# Patient Record
Sex: FEMALE | Race: WHITE | NOT HISPANIC OR LATINO | Employment: OTHER | URBAN - NONMETROPOLITAN AREA
[De-identification: names, ages, dates, MRNs, and addresses within clinical notes are randomized per-mention and may not be internally consistent; named-entity substitution may affect disease eponyms.]

---

## 2024-01-15 PROBLEM — I42.9 CARDIOMYOPATHY (MULTI): Status: ACTIVE | Noted: 2024-01-15

## 2024-01-15 PROBLEM — I10 BENIGN ESSENTIAL HYPERTENSION: Status: ACTIVE | Noted: 2024-01-15

## 2024-01-15 RX ORDER — LOSARTAN POTASSIUM 25 MG/1
25 TABLET ORAL DAILY
COMMUNITY

## 2024-01-15 RX ORDER — ACETAMINOPHEN 500 MG
TABLET ORAL
COMMUNITY

## 2024-01-15 RX ORDER — CARVEDILOL PHOSPHATE 40 MG/1
40 CAPSULE, EXTENDED RELEASE ORAL DAILY
COMMUNITY
End: 2024-02-19 | Stop reason: SDUPTHER

## 2024-01-15 RX ORDER — OXYBUTYNIN CHLORIDE 5 MG/1
1 TABLET ORAL DAILY
COMMUNITY

## 2024-01-15 RX ORDER — PHENYLPROPANOLAMINE/CLEMASTINE 75-1.34MG
200 TABLET, EXTENDED RELEASE ORAL DAILY
COMMUNITY

## 2024-02-06 ENCOUNTER — APPOINTMENT (OUTPATIENT)
Dept: CARDIOLOGY | Facility: CLINIC | Age: 79
End: 2024-02-06
Payer: MEDICARE

## 2024-02-19 ENCOUNTER — OFFICE VISIT (OUTPATIENT)
Dept: CARDIOLOGY | Facility: CLINIC | Age: 79
End: 2024-02-19
Payer: MEDICARE

## 2024-02-19 VITALS
HEART RATE: 52 BPM | WEIGHT: 187 LBS | SYSTOLIC BLOOD PRESSURE: 128 MMHG | HEIGHT: 64 IN | DIASTOLIC BLOOD PRESSURE: 84 MMHG | BODY MASS INDEX: 31.92 KG/M2

## 2024-02-19 DIAGNOSIS — Z87.891 FORMER SMOKER: ICD-10-CM

## 2024-02-19 DIAGNOSIS — I10 BENIGN ESSENTIAL HYPERTENSION: ICD-10-CM

## 2024-02-19 DIAGNOSIS — I42.0 DILATED CARDIOMYOPATHY (MULTI): Primary | ICD-10-CM

## 2024-02-19 PROCEDURE — 99214 OFFICE O/P EST MOD 30 MIN: CPT | Performed by: INTERNAL MEDICINE

## 2024-02-19 PROCEDURE — 3079F DIAST BP 80-89 MM HG: CPT | Performed by: INTERNAL MEDICINE

## 2024-02-19 PROCEDURE — 1036F TOBACCO NON-USER: CPT | Performed by: INTERNAL MEDICINE

## 2024-02-19 PROCEDURE — 3074F SYST BP LT 130 MM HG: CPT | Performed by: INTERNAL MEDICINE

## 2024-02-19 PROCEDURE — 1159F MED LIST DOCD IN RCRD: CPT | Performed by: INTERNAL MEDICINE

## 2024-02-19 RX ORDER — AZELASTINE 1 MG/ML
1 SPRAY, METERED NASAL 2 TIMES DAILY
COMMUNITY

## 2024-02-19 RX ORDER — CARVEDILOL PHOSPHATE 40 MG/1
40 CAPSULE, EXTENDED RELEASE ORAL DAILY
Qty: 90 CAPSULE | Refills: 3 | Status: SHIPPED | OUTPATIENT
Start: 2024-02-19 | End: 2024-05-30 | Stop reason: DRUGHIGH

## 2024-02-19 NOTE — LETTER
"February 19, 2024     Sohan Lance MD  [address]  [facility] [address]    Patient: Milady Colon   YOB: 1945   Date of Visit: 2/19/2024       Dear Dr. Sohan Lance MD:    Thank you for referring Milady Colon to me for evaluation. Below are my notes for this consultation.  If you have questions, please do not hesitate to call me. I look forward to following your patient along with you.       Sincerely,     Navin Riley MD      CC: No Recipients  ______________________________________________________________________________________    Subjective   Milady Colon is a 78 y.o. female       Chief Complaint    Follow-up          HPI     Patient returns in follow-up of problems as noted.  She is done well.  She denies orthopnea PND syncope near syncope or palpitation.  On rare occasion she has some shortness of breath with exertion but she believes is on the basis of age and deconditioning.  She was encouraged to continue aerobic activity and enhance her aerobic capacitance.    She was educated extensively regarding signs and symptoms of heart failure and arrhythmia to watch for.  She was encouraged to call if they arise but otherwise we continue medical therapy as before without change        Vitals:    02/19/24 0914   BP: 128/84   BP Location: Right arm   Patient Position: Sitting   Pulse: 52   Weight: 84.8 kg (187 lb)   Height: 1.626 m (5' 4\")        Objective   Physical Exam  Constitutional:       Appearance: Normal appearance. She is normal weight.   HENT:      Nose: Nose normal.   Neck:      Vascular: No carotid bruit.   Cardiovascular:      Rate and Rhythm: Normal rate.      Pulses: Normal pulses.      Heart sounds: Normal heart sounds.   Pulmonary:      Effort: Pulmonary effort is normal.   Abdominal:      General: Bowel sounds are normal.      Palpations: Abdomen is soft.   Genitourinary:     Rectum: Normal.   Musculoskeletal:         General: Normal range of motion.      " Cervical back: Normal range of motion.      Right lower leg: No edema.      Left lower leg: No edema.   Skin:     General: Skin is warm and dry.   Neurological:      General: No focal deficit present.      Mental Status: She is alert.   Psychiatric:         Mood and Affect: Mood normal.         Behavior: Behavior normal.         Thought Content: Thought content normal.         Judgment: Judgment normal.         Allergies  Patient has no known allergies.     Current Medications    Current Outpatient Medications:   •  azelastine (Astelin) 137 mcg (0.1 %) nasal spray, Administer 1 spray into each nostril 2 times a day. Use in each nostril as directed, Disp: , Rfl:   •  carvedilol CR (Coreg CR) 40 mg 24 hr capsule, Take 1 capsule (40 mg) by mouth once daily. Do not crush or chew., Disp: , Rfl:   •  cholecalciferol (Vitamin D-3) 50 mcg (2,000 unit) capsule, Take by mouth., Disp: , Rfl:   •  ibuprofen (Motrin IB) capsule, Take 1 capsule (200 mg) by mouth once daily., Disp: , Rfl:   •  losartan (Cozaar) 25 mg tablet, Take 1 tablet (25 mg) by mouth once daily., Disp: , Rfl:   •  oxybutynin (Ditropan) 5 mg tablet, Take 1 tablet (5 mg) by mouth once daily., Disp: , Rfl:                      Assessment/Plan   1. Dilated cardiomyopathy (CMS/HCC)  Ejection fraction was 40% now 50%.  Functional class I.    2. Benign essential hypertension  Well-controlled on current therapy.  No need for adjustment    3. Former smoker  She was congratulated on lifestyle modification        Scribe Attestation  By signing my name below, ICara LPN, Scribe   attest that this documentation has been prepared under the direction and in the presence of Navin Riley MD.     Provider Attestation - Scribe documentation    All medical record entries made by the Scribe were at my direction and personally dictated by me. I have reviewed the chart and agree that the record accurately reflects my personal performance of the history, physical  exam, discussion and plan.

## 2024-02-19 NOTE — PROGRESS NOTES
"Subjective   Milady Colon is a 78 y.o. female       Chief Complaint    Follow-up          HPI     Patient returns in follow-up of problems as noted.  She is done well.  She denies orthopnea PND syncope near syncope or palpitation.  On rare occasion she has some shortness of breath with exertion but she believes is on the basis of age and deconditioning.  She was encouraged to continue aerobic activity and enhance her aerobic capacitance.    She was educated extensively regarding signs and symptoms of heart failure and arrhythmia to watch for.  She was encouraged to call if they arise but otherwise we continue medical therapy as before without change        Vitals:    02/19/24 0914   BP: 128/84   BP Location: Right arm   Patient Position: Sitting   Pulse: 52   Weight: 84.8 kg (187 lb)   Height: 1.626 m (5' 4\")        Objective   Physical Exam  Constitutional:       Appearance: Normal appearance. She is normal weight.   HENT:      Nose: Nose normal.   Neck:      Vascular: No carotid bruit.   Cardiovascular:      Rate and Rhythm: Normal rate.      Pulses: Normal pulses.      Heart sounds: Normal heart sounds.   Pulmonary:      Effort: Pulmonary effort is normal.   Abdominal:      General: Bowel sounds are normal.      Palpations: Abdomen is soft.   Genitourinary:     Rectum: Normal.   Musculoskeletal:         General: Normal range of motion.      Cervical back: Normal range of motion.      Right lower leg: No edema.      Left lower leg: No edema.   Skin:     General: Skin is warm and dry.   Neurological:      General: No focal deficit present.      Mental Status: She is alert.   Psychiatric:         Mood and Affect: Mood normal.         Behavior: Behavior normal.         Thought Content: Thought content normal.         Judgment: Judgment normal.         Allergies  Patient has no known allergies.     Current Medications    Current Outpatient Medications:     azelastine (Astelin) 137 mcg (0.1 %) nasal spray, " Administer 1 spray into each nostril 2 times a day. Use in each nostril as directed, Disp: , Rfl:     carvedilol CR (Coreg CR) 40 mg 24 hr capsule, Take 1 capsule (40 mg) by mouth once daily. Do not crush or chew., Disp: , Rfl:     cholecalciferol (Vitamin D-3) 50 mcg (2,000 unit) capsule, Take by mouth., Disp: , Rfl:     ibuprofen (Motrin IB) capsule, Take 1 capsule (200 mg) by mouth once daily., Disp: , Rfl:     losartan (Cozaar) 25 mg tablet, Take 1 tablet (25 mg) by mouth once daily., Disp: , Rfl:     oxybutynin (Ditropan) 5 mg tablet, Take 1 tablet (5 mg) by mouth once daily., Disp: , Rfl:                      Assessment/Plan   1. Dilated cardiomyopathy (CMS/HCC)  Ejection fraction was 40% now 50%.  Functional class I.    2. Benign essential hypertension  Well-controlled on current therapy.  No need for adjustment    3. Former smoker  She was congratulated on lifestyle modification        Scribe Attestation  By signing my name below, I, Oksana Donato LPN   attest that this documentation has been prepared under the direction and in the presence of Navin Riley MD.     Provider Attestation - Scribe documentation    All medical record entries made by the Scribe were at my direction and personally dictated by me. I have reviewed the chart and agree that the record accurately reflects my personal performance of the history, physical exam, discussion and plan.

## 2024-02-19 NOTE — PATIENT INSTRUCTIONS
Please bring all medicines, vitamins, and herbal supplements with you when you come to the office.    Prescriptions will not be filled unless you are compliant with your follow up appointments or have a follow up appointment scheduled as per instruction of your physician. Refills should be requested at the time of your visit.     BMI was above normal measurement. Current weight: 84.8 kg (187 lb)  Weight change since last visit (-) denotes wt loss -8 lbs   Weight loss needed to achieve BMI 25: 41.7 Lbs  Weight loss needed to achieve BMI 30: 12.6 Lbs

## 2024-05-29 ENCOUNTER — TELEPHONE (OUTPATIENT)
Dept: CARDIOLOGY | Facility: CLINIC | Age: 79
End: 2024-05-29
Payer: MEDICARE

## 2024-05-29 DIAGNOSIS — I10 BENIGN ESSENTIAL HYPERTENSION: ICD-10-CM

## 2024-05-29 NOTE — TELEPHONE ENCOUNTER
Patient phoned Carvedilol CR is not covered under insurance plan would be $644 with [facility]. Inquiring about different medication that may be covered under insurance that you would advise for her to take.     [facility]    To Dr. Navin Riley MD for review

## 2024-05-30 RX ORDER — CARVEDILOL 25 MG/1
25 TABLET ORAL
Qty: 180 TABLET | Refills: 3 | Status: SHIPPED | OUTPATIENT
Start: 2024-05-30 | End: 2025-05-30

## 2025-02-04 ENCOUNTER — APPOINTMENT (OUTPATIENT)
Dept: CARDIOLOGY | Facility: CLINIC | Age: 80
End: 2025-02-04
Payer: MEDICARE

## 2025-02-04 ENCOUNTER — OFFICE VISIT (OUTPATIENT)
Dept: CARDIOLOGY | Facility: CLINIC | Age: 80
End: 2025-02-04
Payer: MEDICARE

## 2025-02-04 VITALS
DIASTOLIC BLOOD PRESSURE: 80 MMHG | HEART RATE: 52 BPM | BODY MASS INDEX: 31.07 KG/M2 | WEIGHT: 182 LBS | SYSTOLIC BLOOD PRESSURE: 132 MMHG | HEIGHT: 64 IN

## 2025-02-04 DIAGNOSIS — I42.0 DILATED CARDIOMYOPATHY (MULTI): ICD-10-CM

## 2025-02-04 DIAGNOSIS — I10 BENIGN ESSENTIAL HYPERTENSION: ICD-10-CM

## 2025-02-04 DIAGNOSIS — I44.7 LBBB (LEFT BUNDLE BRANCH BLOCK): Primary | ICD-10-CM

## 2025-02-04 PROCEDURE — 3079F DIAST BP 80-89 MM HG: CPT | Performed by: NURSE PRACTITIONER

## 2025-02-04 PROCEDURE — 99213 OFFICE O/P EST LOW 20 MIN: CPT | Performed by: NURSE PRACTITIONER

## 2025-02-04 PROCEDURE — 3075F SYST BP GE 130 - 139MM HG: CPT | Performed by: NURSE PRACTITIONER

## 2025-02-04 PROCEDURE — 1036F TOBACCO NON-USER: CPT | Performed by: NURSE PRACTITIONER

## 2025-02-04 PROCEDURE — 1160F RVW MEDS BY RX/DR IN RCRD: CPT | Performed by: NURSE PRACTITIONER

## 2025-02-04 PROCEDURE — 1159F MED LIST DOCD IN RCRD: CPT | Performed by: NURSE PRACTITIONER

## 2025-02-04 RX ORDER — LATANOPROST 50 UG/ML
1 SOLUTION/ DROPS OPHTHALMIC NIGHTLY
COMMUNITY

## 2025-02-04 RX ORDER — CARVEDILOL 25 MG/1
25 TABLET ORAL
Qty: 180 TABLET | Refills: 3 | Status: SHIPPED | OUTPATIENT
Start: 2025-02-04 | End: 2026-02-04

## 2025-02-04 ASSESSMENT — ENCOUNTER SYMPTOMS
DYSPNEA ON EXERTION: 0
PND: 0
NEAR-SYNCOPE: 0
IRREGULAR HEARTBEAT: 0
SYNCOPE: 0
ORTHOPNEA: 0
PALPITATIONS: 0

## 2025-02-04 NOTE — PATIENT INSTRUCTIONS
Please bring all medicines, vitamins, and herbal supplements with you when you come to the office.    Prescriptions will not be filled unless you are compliant with your follow up appointments or have a follow up appointment scheduled as per instruction of your physician. Refills should be requested at the time of your visit.     PLAN:   Through informed decision making process incorporating patients unique circumstances, the following treatment plan will be initiated:    1.  Prescription drug management of cardiovascular medication for efficacy, adherence to treatment, side effect assessment and polypharmacy. Current treatment clinically warranted and to continue without modifications.    2.  Echo (NICM)    3. Return for follow-up; in the interim, contact the office if new symptoms arise.  NP annual follow up unless abnormal testing    4.  New medications to consider: Jardiance, Entresto

## 2025-02-04 NOTE — ASSESSMENT & PLAN NOTE
2022 GYN noticed 'skipped heart beats' and PCP ordered:  April 2022 MPI no ischemia, no infarct.  EF 50%    April 2022 TTE  LVEF 40%  LVH mild/moderate  Left atrium mild  MR trace    June 2022 outpatient cardiology consult with Dr. Mckenzie due to abnormal echocardiogram.  Placed on carvedilol and Cozaar at that time.    Repeat May 2023 TTE  LVEF 45-50%  Left atrium mildly dilated    She has chronic left bundle branch block.

## 2025-02-04 NOTE — PROGRESS NOTES
"Chief Complaint  \" Doing good\"    Reason for Visit  Annual follow-up  Patient presents to the office today for outpatient follow-up for nonischemic cardiomyopathy with borderline EF.    Last evaluated in clinic by Dr. Mckenzie February 2024.    Presents today ambulatory with steady gait.  Accompanied by patient  Patient denies any hospitalizations or significant changes to interval medical history since last office follow-up.   She follows routinely with PCP.  Labs January 2025 reviewed.    History of Present Illness   Patient is an extremely pleasant 79-year-old female who presents without voiced cardiovascular complaints.  Her  of 57 years passed away last summer.  She remains aerobically active doing ADLs, housework including mopping and sweeping the floors.  She goes to the grocery store, ambulated in from the parking lot without complaints.  She reports dyspnea on exertion only if she goes up and down steps or has to walk more than half a mile.  She denies any change in exercise capacity or functional tolerance.  No orthopnea or PND.  No palpitations dizziness or lightheadedness.    Patient reports that overall has no complaint(s) of chest pain, chest pressure/discomfort, claudication, dyspnea, exertional chest pressure/discomfort, fatigue, and irregular heart beat    Daily activity:   Greater than 4 METS    Denies any change in exercise capacity or functional tolerance since last office visit.    The importance of primary  prevention reviewed:  HTN: Optimal in office  HLD: Unknown lipids, she reports\" really good\" on last labs  DM: Denies  Smoker: Denies  BMI:  Reviewed the merits of healthy lifestyle choices on overall cardiovascular health.    Reviewed history of cardiomyopathy.  Discussed availability and Jardiance and Entresto.  At this time, we will repeat echo and initiate if warranted.    Discussed the dynamic nature of coronary artery disease and the importance of seeking medical attention if new " "symptoms arise.    Review of Systems   Cardiovascular:  Negative for chest pain, dyspnea on exertion, irregular heartbeat, leg swelling, near-syncope, orthopnea, palpitations, paroxysmal nocturnal dyspnea and syncope.        Visit Vitals  /80 (BP Location: Left arm, Patient Position: Sitting)   Pulse 52   Ht 1.626 m (5' 4\")   Wt 82.6 kg (182 lb)   BMI 31.24 kg/m²   Smoking Status Former   BSA 1.93 m²     Physical Exam  Vitals and nursing note reviewed.   HENT:      Head: Normocephalic.   Cardiovascular:      Rate and Rhythm: Normal rate and regular rhythm.      Heart sounds: Normal heart sounds.   Pulmonary:      Effort: Pulmonary effort is normal.      Breath sounds: Normal breath sounds.   Abdominal:      Palpations: Abdomen is soft.   Musculoskeletal:      Right lower leg: No edema.      Left lower leg: No edema.   Skin:     General: Skin is warm and dry.   Neurological:      General: No focal deficit present.      Mental Status: She is alert.   Psychiatric:         Mood and Affect: Mood normal.         Behavior: Behavior normal.      No Known Allergies    Current Outpatient Medications   Medication Instructions    carvedilol (COREG) 25 mg, oral, 2 times daily (morning and late afternoon)    cholecalciferol (Vitamin D-3) 50 mcg (2,000 unit) capsule Take by mouth.    latanoprost (Xalatan) 0.005 % ophthalmic solution 1 drop, Nightly    losartan (COZAAR) 25 mg, Daily    multivitamin with minerals iron-free (Centrum Silver) 1 tablet, Daily      Assessment:    Cardiac and Vasculature  2022 GYN noticed 'skipped heart beats' and PCP ordered:  April 2022 MPI no ischemia, no infarct.  EF 50%    April 2022 TTE  LVEF 40%  LVH mild/moderate  Left atrium mild  MR trace    June 2022 outpatient cardiology consult with Dr. Mckenzie due to abnormal echocardiogram.  Placed on carvedilol and Cozaar at that time.    Repeat May 2023 TTE  LVEF 45-50%  Left atrium mildly dilated    She has chronic left bundle branch " block.    Benign essential hypertension  optimal in office      Cardiomyopathy (Multi)  NICM HF borderline EF 45-50% May 2023 TTE  FC II Stage C    GDMT:  Coreg & cozaar    Discussed availability of Jardiance and Entresto.  Will repeat echocardiogram and initiate if warranted.    LBBB (left bundle branch block)  April 2022 MPI no ischemia, no infarct.    BMI 31.0-31.9,adult  Weight is down 5 pounds    Reviewed the merits of healthy lifestyle choices on overall cardiovascular health.    Plan:     Through informed decision making process incorporating patients unique circumstances, the following treatment plan will be initiated:    1.  Prescription drug management of cardiovascular medication for efficacy, adherence to treatment, side effect assessment and polypharmacy. Current treatment clinically warranted and to continue without modifications.    2.  Echo (NICM)    3. Return for follow-up; in the interim, contact the office if new symptoms arise.  NP annual follow up unless abnormal testing    4.  New medications to consider: Ana Castaneda MSN, APRN-CNP, PMHNP-[facility] [place]  [place], [place]     Please excuse any errors in grammar or translation related to this dictation. Voice recognition software was utilized to prepare this document.

## 2025-02-04 NOTE — ASSESSMENT & PLAN NOTE
Weight is down 5 pounds    Reviewed the merits of healthy lifestyle choices on overall cardiovascular health.

## 2025-02-04 NOTE — LETTER
"February 4, 2025     Sohan Lance MD  [address]  [place] [address]    Patient: Milady Colon   YOB: 1945   Date of Visit: 2/4/2025       Dear Dr. Sohan Lance MD:    Thank you for referring Milady Colon to me for evaluation. Below are my notes for this consultation.  If you have questions, please do not hesitate to call me. I look forward to following your patient along with you.       Sincerely,     Minoo Palm, APRN-CNP      CC: No Recipients  ______________________________________________________________________________________    Chief Complaint  \" Doing good\"    Reason for Visit  Annual follow-up  Patient presents to the office today for outpatient follow-up for nonischemic cardiomyopathy with borderline EF.    Last evaluated in clinic by Dr. Mckenzie February 2024.    Presents today ambulatory with steady gait.  Accompanied by patient  Patient denies any hospitalizations or significant changes to interval medical history since last office follow-up.   She follows routinely with PCP.  Labs January 2025 reviewed.    History of Present Illness   Patient is an extremely pleasant 79-year-old female who presents without voiced cardiovascular complaints.  Her  of 57 years passed away last summer.  She remains aerobically active doing ADLs, housework including mopping and sweeping the floors.  She goes to the grocery store, ambulated in from the parking lot without complaints.  She reports dyspnea on exertion only if she goes up and down steps or has to walk more than half a mile.  She denies any change in exercise capacity or functional tolerance.  No orthopnea or PND.  No palpitations dizziness or lightheadedness.    Patient reports that overall has no complaint(s) of chest pain, chest pressure/discomfort, claudication, dyspnea, exertional chest pressure/discomfort, fatigue, and irregular heart beat    Daily activity:   Greater than 4 METS    Denies any change in " "exercise capacity or functional tolerance since last office visit.    The importance of primary  prevention reviewed:  HTN: Optimal in office  HLD: Unknown lipids, she reports\" really good\" on last labs  DM: Denies  Smoker: Denies  BMI:  Reviewed the merits of healthy lifestyle choices on overall cardiovascular health.    Reviewed history of cardiomyopathy.  Discussed availability and Jardiance and Entresto.  At this time, we will repeat echo and initiate if warranted.    Discussed the dynamic nature of coronary artery disease and the importance of seeking medical attention if new symptoms arise.    Review of Systems   Cardiovascular:  Negative for chest pain, dyspnea on exertion, irregular heartbeat, leg swelling, near-syncope, orthopnea, palpitations, paroxysmal nocturnal dyspnea and syncope.        Visit Vitals  /80 (BP Location: Left arm, Patient Position: Sitting)   Pulse 52   Ht 1.626 m (5' 4\")   Wt 82.6 kg (182 lb)   BMI 31.24 kg/m²   Smoking Status Former   BSA 1.93 m²     Physical Exam  Vitals and nursing note reviewed.   HENT:      Head: Normocephalic.   Cardiovascular:      Rate and Rhythm: Normal rate and regular rhythm.      Heart sounds: Normal heart sounds.   Pulmonary:      Effort: Pulmonary effort is normal.      Breath sounds: Normal breath sounds.   Abdominal:      Palpations: Abdomen is soft.   Musculoskeletal:      Right lower leg: No edema.      Left lower leg: No edema.   Skin:     General: Skin is warm and dry.   Neurological:      General: No focal deficit present.      Mental Status: She is alert.   Psychiatric:         Mood and Affect: Mood normal.         Behavior: Behavior normal.      No Known Allergies    Current Outpatient Medications   Medication Instructions   • carvedilol (COREG) 25 mg, oral, 2 times daily (morning and late afternoon)   • cholecalciferol (Vitamin D-3) 50 mcg (2,000 unit) capsule Take by mouth.   • latanoprost (Xalatan) 0.005 % ophthalmic solution 1 drop, " Nightly   • losartan (COZAAR) 25 mg, Daily   • multivitamin with minerals iron-free (Centrum Silver) 1 tablet, Daily      Assessment:    Cardiac and Vasculature  2022 GYN noticed 'skipped heart beats' and PCP ordered:  April 2022 MPI no ischemia, no infarct.  EF 50%    April 2022 TTE  LVEF 40%  LVH mild/moderate  Left atrium mild  MR trace    June 2022 outpatient cardiology consult with Dr. Mckenzie due to abnormal echocardiogram.  Placed on carvedilol and Cozaar at that time.    Repeat May 2023 TTE  LVEF 45-50%  Left atrium mildly dilated    She has chronic left bundle branch block.    Benign essential hypertension  optimal in office      Cardiomyopathy (Multi)  NICM HF borderline EF 45-50% May 2023 TTE  FC II Stage C    GDMT:  Coreg & cozaar    Discussed availability of Jardiance and Entresto.  Will repeat echocardiogram and initiate if warranted.    LBBB (left bundle branch block)  April 2022 MPI no ischemia, no infarct.    BMI 31.0-31.9,adult  Weight is down 5 pounds    Reviewed the merits of healthy lifestyle choices on overall cardiovascular health.    Plan:     Through informed decision making process incorporating patients unique circumstances, the following treatment plan will be initiated:    1.  Prescription drug management of cardiovascular medication for efficacy, adherence to treatment, side effect assessment and polypharmacy. Current treatment clinically warranted and to continue without modifications.    2.  Echo (NICM)    3. Return for follow-up; in the interim, contact the office if new symptoms arise.  NP annual follow up unless abnormal testing    4.  New medications to consider: Jardiance, Entresto     Minoo Palm MSN, APRN-CNP, PMHNP-BC  [facility] [place]  [place], [place]     Please excuse any errors in grammar or translation related to this dictation. Voice recognition software was utilized to prepare this document.

## 2025-02-04 NOTE — ASSESSMENT & PLAN NOTE
NICM HF borderline EF 45-50% May 2023 TTE  FC II Stage C    GDMT:  Coreg & cozaar    Discussed availability of Jardiance and Entresto.  Will repeat echocardiogram and initiate if warranted.

## 2025-03-15 ENCOUNTER — HOSPITAL ENCOUNTER (OUTPATIENT)
Dept: CARDIOLOGY | Facility: CLINIC | Age: 80
Discharge: HOME | End: 2025-03-15
Payer: MEDICARE

## 2025-03-15 DIAGNOSIS — I10 BENIGN ESSENTIAL HYPERTENSION: ICD-10-CM

## 2025-03-15 DIAGNOSIS — I42.8 OTHER CARDIOMYOPATHIES: ICD-10-CM

## 2025-03-15 DIAGNOSIS — I42.0 DILATED CARDIOMYOPATHY (MULTI): ICD-10-CM

## 2025-03-15 DIAGNOSIS — I44.7 LBBB (LEFT BUNDLE BRANCH BLOCK): ICD-10-CM

## 2025-03-15 PROCEDURE — 93306 TTE W/DOPPLER COMPLETE: CPT | Performed by: INTERNAL MEDICINE

## 2025-03-15 PROCEDURE — 93306 TTE W/DOPPLER COMPLETE: CPT

## 2025-03-17 LAB
AORTIC VALVE MEAN GRADIENT: 4 MMHG
AORTIC VALVE PEAK VELOCITY: 1.4 M/S
AV PEAK GRADIENT: 8 MMHG
AVA (PEAK VEL): 2.29 CM2
AVA (VTI): 2.61 CM2
EJECTION FRACTION APICAL 4 CHAMBER: 48.3
EJECTION FRACTION: 55 %
LEFT ATRIUM VOLUME AREA LENGTH INDEX BSA: 30.7 ML/M2
LEFT VENTRICLE INTERNAL DIMENSION DIASTOLE: 4.32 CM (ref 3.5–6)
LEFT VENTRICULAR OUTFLOW TRACT DIAMETER: 2.18 CM
LV EJECTION FRACTION BIPLANE: 59 %
MITRAL VALVE E/A RATIO: 0.7
MITRAL VALVE E/E' RATIO: 10.4
RIGHT VENTRICLE FREE WALL PEAK S': 13.91 CM/S
RIGHT VENTRICLE PEAK SYSTOLIC PRESSURE: 37.6 MMHG
TRICUSPID ANNULAR PLANE SYSTOLIC EXCURSION: 2.5 CM

## 2025-03-18 ENCOUNTER — TELEPHONE (OUTPATIENT)
Dept: CARDIOLOGY | Facility: CLINIC | Age: 80
End: 2025-03-18
Payer: COMMERCIAL

## 2025-03-18 NOTE — TELEPHONE ENCOUNTER
Result Communication    Resulted Orders   Transthoracic Echo Complete   Result Value Ref Range    AV mn grad 4 mmHg    AV pk yanira 1.40 m/s    LV Biplane EF 59 %    LVOT diam 2.18 cm    MV E/A ratio 0.70     Tricuspid annular plane systolic excursion 2.5 cm    MV avg E/e' ratio 10.40     LA vol index A/L 30.7 ml/m2    LV EF 55 %    RV free wall pk S' 13.91 cm/s    RVSP 37.6 mmHg    LVIDd 4.32 cm    Aortic Valve Area by Continuity of Peak Velocity 2.29 cm2    AV pk grad 8 mmHg    Aortic Valve Area by Continuity of VTI 2.61 cm2    LV A4C EF 48.3     Narrative                   [address], [address], [place] [address]          Tel [phone] Fax [phone]    TRANSTHORACIC ECHOCARDIOGRAM REPORT    Patient Name:       SAW Lou Physician:    [id number] Marcial Pandya MD  Study Date:         3/15/2025           Ordering Provider:    [id number] NAVEED SHARPE  MRN/PID:            [id number]            Fellow:  Accession#:         [id number]        Nurse:  Date of Birth/Age:  1945 / 79      Sonographer:          Hien oropeza                                     RDCS, RT(R),                                                                RDMS, RVT  Gender Assigned at  F                   Additional Staff:  Birth:  Height:             162.56 cm           Admit Date:  Weight:             82.55 kg            Admission Status:     Outpatient  BSA / BMI:          1.88 m2 / 31.24     Department Location:  [facility]                      kg/m2                                     [place]  Blood Pressure: 160 /82 mmHg    Study Type:    TRANSTHORACIC ECHO (TTE) COMPLETE  Diagnosis/ICD: Essential (primary) hypertension-I10; Left bundle branch block,                 unspecified-I44.7; Other  cardiomyopathies-I42.8  Indication:    HTN LBBB Cardiomyopathy  CPT Codes:     Echo Complete w Full [id number]   Study Detail: The following Echo studies were performed: 2D, M-Mode, Doppler and                color flow.       PHYSICIAN INTERPRETATION:  Left Ventricle: Left ventricular ejection fraction is low normal, by visual estimate at 55%. There are no regional wall motion abnormalities. The left ventricular cavity size is normal. There is left ventricular concentric remodeling. Spectral Doppler shows a Grade I (impaired relaxation pattern) of left ventricular diastolic filling with normal left atrial filling pressure. Mild concentric left nuclear hypertrophy.  Left Atrium: The left atrial size is normal.  Right Ventricle: The right ventricle is normal in size. There is normal right ventricular global systolic function.  Right Atrium: The right atrial size is normal.  Aortic Valve: The aortic valve is trileaflet. The aortic valve dimensionless index is 0.70. There is trace aortic valve regurgitation. The peak instantaneous gradient of the aortic valve is 8 mmHg. The mean gradient of the aortic valve is 4 mmHg.  Mitral Valve: The mitral valve is normal in structure. The peak instantaneous gradient of the mitral valve is 5 mmHg. There is no evidence of mitral valve regurgitation.  Tricuspid Valve: The tricuspid valve is structurally normal. There is trace tricuspid regurgitation. The Doppler estimated RVSP is mildly elevated right ventricular systolic pressure at 37.6 mmHg.  Pulmonic Valve: The pulmonic valve is structurally normal. There is no indication of pulmonic valve regurgitation.  Pericardium: No pericardial effusion noted.  Aorta: The aortic root is normal.       CONCLUSIONS:   1. Left ventricular ejection fraction is low normal, by visual estimate at 55%.   2. Spectral Doppler shows a Grade I (impaired relaxation pattern) of left ventricular diastolic filling with normal left atrial filling  pressure.   3. Mild concentric left nuclear hypertrophy.   4. Mildly elevated right ventricular systolic pressure.   5. When compared to previous study LVEF has improved.    QUANTITATIVE DATA SUMMARY:     2D MEASUREMENTS:             Normal Ranges:  Ao Root s:       2.90 cm  LAs:             3.67 cm     (2.7-4.0cm)  RVIDd:           2.13 cm     (0.9-3.6cm)  IVSd:            1.00 cm     (0.6-1.1cm)  LVPWd:           1.06 cm     (0.6-1.1cm)  LVIDd:           4.32 cm     (3.9-5.9cm)  LVIDs:           3.09 cm  LV Mass Index:   79.1 g/m2  LVEDV Index:     33.06 ml/m2  LV % FS          28.4 %       LEFT ATRIUM:                 Normal Ranges:  LA Vol A4C:       51.9 ml    (22+/-6mL/m2)  LA Vol A2C:       63.3 ml  LA Vol BP:        57.7 ml  LA Vol Index A4C: 27.6ml/m2  LA Vol Index A2C: 33.7 ml/m2  LA Vol Index BP:  30.7 ml/m2  LA Vol A4C:       50.1 ml  LA Vol A2C:       59.0 ml  LA Vol Index BSA: 29.0 ml/m2       LV SYSTOLIC FUNCTION:                       Normal Ranges:  EF-A4C View:    48 % (>=55%)  EF-A2C View:    66 %  EF-Biplane:     59 %  EF-Visual:      55 %  LV EF Reported: 55 %       LV DIASTOLIC FUNCTION:             Normal Ranges:  MV Peak E:             0.72 m/s    (0.7-1.2 m/s)  MV Peak A:             1.04 m/s    (0.42-0.7 m/s)  E/A Ratio:             0.70        (1.0-2.2)  MV e'                  0.070 m/s   (>8.0)  MV lateral e'          0.07 m/s  MV medial e'           0.07 m/s  E/e' Ratio:            10.40       (<8.0)  PulmV Sys Antonino:         48.49 cm/s  PulmV Winkler Antonino:        35.51 cm/s  PulmV S/D Antonino:         1.37  PulmV A Revs Antonino:      26.50 cm/s  PulmV A Revs Dur:      117.98 msec       MITRAL VALVE:          Normal Ranges:  MV Vmax:      1.13 m/s (<=1.3m/s)  MV peak P.1 mmHg (<5mmHg)  MV mean P.7 mmHg (<48mmHg)  MV VTI:       29.86 cm (10-13cm)  MV DT:        135 msec (150-240msec)  MV PHT:       46 msec  (30-60msec)  MVA by PHT:   4.78 cm2 (4-6cm2)       MITRAL INSUFFICIENCY:              Normal Ranges:  MR Vmax:              478.39 cm/s       AORTIC VALVE:                     Normal Ranges:  AoV Vmax:                1.40 m/s (<=1.7m/s)  AoV Peak P.9 mmHg (<20mmHg)  AoV Mean P.4 mmHg (1.7-11.5mmHg)  LVOT Max Antonino:            0.86 m/s (<=1.1m/s)  AoV VTI:                 37.69 cm (18-25cm)  LVOT VTI:                26.39 cm  LVOT Diameter:           2.18 cm  (1.8-2.4cm)  AoV Area, VTI:           2.61 cm2 (2.5-5.5cm2)  AoV Area,Vmax:           2.29 cm2 (2.5-4.5cm2)  AoV Dimensionless Index: 0.70       AORTIC INSUFFICIENCY:  AI Vmax:       3.22 m/s  AI Half-time:  657 msec  AI Decel Time: 2267 msec  AI Decel Rate: 147.68 cm/s2       RIGHT VENTRICLE:  RV Basal 2.66 cm  RV Major 5.1 cm  TAPSE:   25.3 mm  RV s'    0.14 m/s       TRICUSPID VALVE/RVSP:          Normal Ranges:  Peak TR Velocity:     2.86 m/s  Est. RA Pressure:     5 mmHg  RV Syst Pressure:     38 mmHg  (< 30mmHg)  IVC Diam:             1.97 cm       PULMONIC VALVE:          Normal Ranges:  RVOT Vmax:      0.81 m/s (0.6-0.9m/s)  PV Accel Time:  88 msec  (>120ms)  PV Max Antonino:     1.3 m/s  (0.6-0.9m/s)  PV Max P.7 mmHg  PV Mean P.0 mmHg       PULMONARY VEINS:  PulmV A Revs Dur: 117.98 msec  PulmV A Revs Antonino: 26.50 cm/s  PulmV Winkler Antonino:   35.51 cm/s  PulmV S/D Antonino:    1.37  PulmV Sys Antonino:    48.49 cm/s       AORTA:  Asc Ao Diam 3.21 cm       [id number] Marcial Pandya MD  Electronically signed on 3/17/2025 at 6:02:08 PM         ** Final **         3:00 PM      Results were successfully communicated with the patient and they acknowledged their understanding.

## 2025-03-18 NOTE — TELEPHONE ENCOUNTER
----- Message from Minoo Palm sent at 3/18/2025 10:07 AM EDT -----  Her know echocardiogram showed improvement in LVEF up to 55%.  No indication for changes at this time.  ----- Message -----  From: Arnoldo Garciao - Cardiology Results In  Sent: 3/17/2025   6:02 PM EDT  To: STANLEY Flores-CNP

## 2025-03-26 ENCOUNTER — APPOINTMENT (OUTPATIENT)
Dept: CARDIOLOGY | Facility: CLINIC | Age: 80
End: 2025-03-26
Payer: MEDICARE

## 2026-02-16 ENCOUNTER — APPOINTMENT (OUTPATIENT)
Dept: CARDIOLOGY | Facility: CLINIC | Age: 81
End: 2026-02-16
Payer: COMMERCIAL